# Patient Record
Sex: FEMALE | Race: BLACK OR AFRICAN AMERICAN | Employment: OTHER | URBAN - METROPOLITAN AREA
[De-identification: names, ages, dates, MRNs, and addresses within clinical notes are randomized per-mention and may not be internally consistent; named-entity substitution may affect disease eponyms.]

---

## 2021-01-01 ENCOUNTER — APPOINTMENT (OUTPATIENT)
Dept: GENERAL RADIOLOGY | Age: 81
End: 2021-01-01
Attending: EMERGENCY MEDICINE
Payer: MEDICARE

## 2021-01-01 ENCOUNTER — HOSPITAL ENCOUNTER (EMERGENCY)
Age: 81
End: 2021-04-03
Attending: EMERGENCY MEDICINE
Payer: MEDICARE

## 2021-01-01 ENCOUNTER — APPOINTMENT (OUTPATIENT)
Dept: CT IMAGING | Age: 81
End: 2021-01-01
Attending: EMERGENCY MEDICINE
Payer: MEDICARE

## 2021-01-01 VITALS
DIASTOLIC BLOOD PRESSURE: 81 MMHG | BODY MASS INDEX: 33.38 KG/M2 | SYSTOLIC BLOOD PRESSURE: 137 MMHG | HEIGHT: 60 IN | OXYGEN SATURATION: 98 % | TEMPERATURE: 97.6 F | WEIGHT: 170 LBS

## 2021-01-01 DIAGNOSIS — I71.30 RUPTURED ABDOMINAL AORTIC ANEURYSM (AAA): Primary | ICD-10-CM

## 2021-01-01 DIAGNOSIS — R10.31 RLQ ABDOMINAL PAIN: ICD-10-CM

## 2021-01-01 DIAGNOSIS — I46.9 CARDIOPULMONARY ARREST (HCC): ICD-10-CM

## 2021-01-01 LAB
ALBUMIN SERPL-MCNC: 3.6 G/DL (ref 3.4–5)
ALBUMIN/GLOB SERPL: 1.1 {RATIO} (ref 0.8–1.7)
ALP SERPL-CCNC: 88 U/L (ref 45–117)
ALT SERPL-CCNC: 20 U/L (ref 13–56)
ANION GAP SERPL CALC-SCNC: 11 MMOL/L (ref 3–18)
APTT PPP: 25.7 SEC (ref 23–36.4)
AST SERPL-CCNC: 17 U/L (ref 10–38)
BASOPHILS # BLD: 0 K/UL (ref 0–0.1)
BASOPHILS NFR BLD: 0 % (ref 0–2)
BILIRUB SERPL-MCNC: 0.5 MG/DL (ref 0.2–1)
BUN SERPL-MCNC: 17 MG/DL (ref 7–18)
BUN/CREAT SERPL: 14 (ref 12–20)
CALCIUM SERPL-MCNC: 9.2 MG/DL (ref 8.5–10.1)
CHLORIDE SERPL-SCNC: 108 MMOL/L (ref 100–111)
CK MB CFR SERPL CALC: 2 % (ref 0–4)
CK MB SERPL-MCNC: 1.4 NG/ML (ref 5–25)
CK SERPL-CCNC: 70 U/L (ref 26–192)
CO2 SERPL-SCNC: 23 MMOL/L (ref 21–32)
CREAT SERPL-MCNC: 1.19 MG/DL (ref 0.6–1.3)
DIFFERENTIAL METHOD BLD: ABNORMAL
EOSINOPHIL # BLD: 0 K/UL (ref 0–0.4)
EOSINOPHIL NFR BLD: 0 % (ref 0–5)
ERYTHROCYTE [DISTWIDTH] IN BLOOD BY AUTOMATED COUNT: 14.7 % (ref 11.6–14.5)
GLOBULIN SER CALC-MCNC: 3.3 G/DL (ref 2–4)
GLUCOSE BLD STRIP.AUTO-MCNC: 186 MG/DL (ref 70–110)
GLUCOSE SERPL-MCNC: 190 MG/DL (ref 74–99)
HCT VFR BLD AUTO: 39.8 % (ref 35–45)
HGB BLD-MCNC: 13.6 G/DL (ref 12–16)
INR PPP: 1.1 (ref 0.8–1.2)
LACTATE BLD-SCNC: 4.06 MMOL/L (ref 0.4–2)
LIPASE SERPL-CCNC: 130 U/L (ref 73–393)
LYMPHOCYTES # BLD: 2.4 K/UL (ref 0.9–3.6)
LYMPHOCYTES NFR BLD: 13 % (ref 21–52)
MCH RBC QN AUTO: 32.1 PG (ref 24–34)
MCHC RBC AUTO-ENTMCNC: 34.2 G/DL (ref 31–37)
MCV RBC AUTO: 93.9 FL (ref 74–97)
MONOCYTES # BLD: 1.2 K/UL (ref 0.05–1.2)
MONOCYTES NFR BLD: 7 % (ref 3–10)
NEUTS SEG # BLD: 14.9 K/UL (ref 1.8–8)
NEUTS SEG NFR BLD: 80 % (ref 40–73)
PLATELET # BLD AUTO: 223 K/UL (ref 135–420)
PMV BLD AUTO: 10.8 FL (ref 9.2–11.8)
POTASSIUM SERPL-SCNC: 3.5 MMOL/L (ref 3.5–5.5)
PROT SERPL-MCNC: 6.9 G/DL (ref 6.4–8.2)
PROTHROMBIN TIME: 14.3 SEC (ref 11.5–15.2)
RBC # BLD AUTO: 4.24 M/UL (ref 4.2–5.3)
SODIUM SERPL-SCNC: 142 MMOL/L (ref 136–145)
TROPONIN I SERPL-MCNC: <0.02 NG/ML (ref 0–0.04)
WBC # BLD AUTO: 18.5 K/UL (ref 4.6–13.2)

## 2021-01-01 PROCEDURE — 99291 CRITICAL CARE FIRST HOUR: CPT

## 2021-01-01 PROCEDURE — 82550 ASSAY OF CK (CPK): CPT

## 2021-01-01 PROCEDURE — 96374 THER/PROPH/DIAG INJ IV PUSH: CPT

## 2021-01-01 PROCEDURE — 74011250636 HC RX REV CODE- 250/636: Performed by: EMERGENCY MEDICINE

## 2021-01-01 PROCEDURE — 36430 TRANSFUSION BLD/BLD COMPNT: CPT

## 2021-01-01 PROCEDURE — 92960 CARDIOVERSION ELECTRIC EXT: CPT

## 2021-01-01 PROCEDURE — 93005 ELECTROCARDIOGRAM TRACING: CPT

## 2021-01-01 PROCEDURE — 86920 COMPATIBILITY TEST SPIN: CPT

## 2021-01-01 PROCEDURE — 75810000304 HC PLACE NEED INTRAOSSEOUS INFUS

## 2021-01-01 PROCEDURE — 85730 THROMBOPLASTIN TIME PARTIAL: CPT

## 2021-01-01 PROCEDURE — 86901 BLOOD TYPING SEROLOGIC RH(D): CPT

## 2021-01-01 PROCEDURE — 82962 GLUCOSE BLOOD TEST: CPT

## 2021-01-01 PROCEDURE — 83690 ASSAY OF LIPASE: CPT

## 2021-01-01 PROCEDURE — 85610 PROTHROMBIN TIME: CPT

## 2021-01-01 PROCEDURE — 74176 CT ABD & PELVIS W/O CONTRAST: CPT

## 2021-01-01 PROCEDURE — 80053 COMPREHEN METABOLIC PANEL: CPT

## 2021-01-01 PROCEDURE — 74011250636 HC RX REV CODE- 250/636

## 2021-01-01 PROCEDURE — 83605 ASSAY OF LACTIC ACID: CPT

## 2021-01-01 PROCEDURE — 74011000250 HC RX REV CODE- 250: Performed by: EMERGENCY MEDICINE

## 2021-01-01 PROCEDURE — 85025 COMPLETE CBC W/AUTO DIFF WBC: CPT

## 2021-01-01 PROCEDURE — P9016 RBC LEUKOCYTES REDUCED: HCPCS

## 2021-01-01 PROCEDURE — 92950 HEART/LUNG RESUSCITATION CPR: CPT

## 2021-01-01 PROCEDURE — 31500 INSERT EMERGENCY AIRWAY: CPT

## 2021-01-01 PROCEDURE — C1751 CATH, INF, PER/CENT/MIDLINE: HCPCS

## 2021-01-01 PROCEDURE — 96375 TX/PRO/DX INJ NEW DRUG ADDON: CPT

## 2021-01-01 RX ORDER — SODIUM CHLORIDE 9 MG/ML
250 INJECTION, SOLUTION INTRAVENOUS AS NEEDED
Status: DISCONTINUED | OUTPATIENT
Start: 2021-01-01 | End: 2021-04-03 | Stop reason: HOSPADM

## 2021-01-01 RX ORDER — ONDANSETRON 2 MG/ML
INJECTION INTRAMUSCULAR; INTRAVENOUS
Status: COMPLETED
Start: 2021-01-01 | End: 2021-01-01

## 2021-01-01 RX ORDER — ROCURONIUM BROMIDE 10 MG/ML
INJECTION, SOLUTION INTRAVENOUS
Status: COMPLETED | OUTPATIENT
Start: 2021-01-01 | End: 2021-01-01

## 2021-01-01 RX ORDER — ROCURONIUM BROMIDE 10 MG/ML
70 INJECTION, SOLUTION INTRAVENOUS
Status: DISCONTINUED | OUTPATIENT
Start: 2021-01-01 | End: 2021-04-03 | Stop reason: HOSPADM

## 2021-01-01 RX ORDER — NALOXONE HYDROCHLORIDE 1 MG/ML
0.5 INJECTION INTRAMUSCULAR; INTRAVENOUS; SUBCUTANEOUS ONCE
Status: COMPLETED | OUTPATIENT
Start: 2021-01-01 | End: 2021-01-01

## 2021-01-01 RX ORDER — MORPHINE SULFATE 4 MG/ML
4 INJECTION INTRAVENOUS ONCE
Status: COMPLETED | OUTPATIENT
Start: 2021-01-01 | End: 2021-01-01

## 2021-01-01 RX ORDER — EPINEPHRINE 0.1 MG/ML
INJECTION INTRACARDIAC; INTRAVENOUS
Status: COMPLETED | OUTPATIENT
Start: 2021-01-01 | End: 2021-01-01

## 2021-01-01 RX ORDER — PRAVASTATIN SODIUM 20 MG/1
20 TABLET ORAL
COMMUNITY

## 2021-01-01 RX ORDER — SODIUM CHLORIDE 0.9 % (FLUSH) 0.9 %
5-10 SYRINGE (ML) INJECTION AS NEEDED
Status: DISCONTINUED | OUTPATIENT
Start: 2021-01-01 | End: 2021-04-03 | Stop reason: HOSPADM

## 2021-01-01 RX ORDER — ETOMIDATE 2 MG/ML
20 INJECTION INTRAVENOUS ONCE
Status: DISCONTINUED | OUTPATIENT
Start: 2021-01-01 | End: 2021-04-03 | Stop reason: HOSPADM

## 2021-01-01 RX ORDER — ETOMIDATE 2 MG/ML
INJECTION INTRAVENOUS
Status: COMPLETED | OUTPATIENT
Start: 2021-01-01 | End: 2021-01-01

## 2021-01-01 RX ORDER — EPINEPHRINE 0.1 MG/ML
INJECTION INTRACARDIAC; INTRAVENOUS
Status: DISCONTINUED
Start: 2021-01-01 | End: 2021-01-01 | Stop reason: WASHOUT

## 2021-01-01 RX ORDER — NALOXONE HYDROCHLORIDE 1 MG/ML
INJECTION INTRAMUSCULAR; INTRAVENOUS; SUBCUTANEOUS
Status: DISCONTINUED
Start: 2021-01-01 | End: 2021-04-03 | Stop reason: HOSPADM

## 2021-01-01 RX ORDER — GUAIFENESIN 100 MG/5ML
81 LIQUID (ML) ORAL DAILY
COMMUNITY

## 2021-01-01 RX ORDER — ONDANSETRON 2 MG/ML
4 INJECTION INTRAMUSCULAR; INTRAVENOUS
Status: COMPLETED | OUTPATIENT
Start: 2021-01-01 | End: 2021-01-01

## 2021-01-01 RX ADMIN — EPINEPHRINE 1 MG: 0.1 INJECTION, SOLUTION ENDOTRACHEAL; INTRACARDIAC; INTRAVENOUS at 21:47

## 2021-01-01 RX ADMIN — EPINEPHRINE 1 MG: 0.1 INJECTION, SOLUTION ENDOTRACHEAL; INTRACARDIAC; INTRAVENOUS at 21:50

## 2021-01-01 RX ADMIN — EPINEPHRINE 1 MG: 0.1 INJECTION, SOLUTION ENDOTRACHEAL; INTRACARDIAC; INTRAVENOUS at 21:34

## 2021-01-01 RX ADMIN — EPINEPHRINE 1 MG: 0.1 INJECTION, SOLUTION ENDOTRACHEAL; INTRACARDIAC; INTRAVENOUS at 21:42

## 2021-01-01 RX ADMIN — ETOMIDATE 20 MG: 2 INJECTION, SOLUTION INTRAVENOUS at 21:28

## 2021-01-01 RX ADMIN — EPINEPHRINE 1 MG: 0.1 INJECTION, SOLUTION ENDOTRACHEAL; INTRACARDIAC; INTRAVENOUS at 21:39

## 2021-01-01 RX ADMIN — EPINEPHRINE 1 MG: 0.1 INJECTION, SOLUTION ENDOTRACHEAL; INTRACARDIAC; INTRAVENOUS at 21:44

## 2021-01-01 RX ADMIN — EPINEPHRINE 1 MG: 0.1 INJECTION, SOLUTION ENDOTRACHEAL; INTRACARDIAC; INTRAVENOUS at 21:30

## 2021-01-01 RX ADMIN — EPINEPHRINE 1 MG: 0.1 INJECTION, SOLUTION ENDOTRACHEAL; INTRACARDIAC; INTRAVENOUS at 21:26

## 2021-01-01 RX ADMIN — ROCURONIUM BROMIDE 70 MG: 10 INJECTION, SOLUTION INTRAVENOUS at 21:50

## 2021-01-01 RX ADMIN — EPINEPHRINE 1 MG: 0.1 INJECTION, SOLUTION ENDOTRACHEAL; INTRACARDIAC; INTRAVENOUS at 21:23

## 2021-01-01 RX ADMIN — ONDANSETRON 4 MG: 2 INJECTION INTRAMUSCULAR; INTRAVENOUS at 20:34

## 2021-01-01 RX ADMIN — EPINEPHRINE 1 MG: 0.1 INJECTION, SOLUTION ENDOTRACHEAL; INTRACARDIAC; INTRAVENOUS at 21:37

## 2021-01-01 RX ADMIN — SODIUM CHLORIDE 1000 ML: 900 INJECTION, SOLUTION INTRAVENOUS at 20:33

## 2021-01-01 RX ADMIN — MORPHINE SULFATE 4 MG: 4 INJECTION INTRAVENOUS at 20:33

## 2021-01-01 RX ADMIN — EPINEPHRINE 1 MG: 0.1 INJECTION, SOLUTION ENDOTRACHEAL; INTRACARDIAC; INTRAVENOUS at 21:36

## 2021-01-01 RX ADMIN — NALOXONE HYDROCHLORIDE 0.5 MG: 1 INJECTION PARENTERAL at 20:38

## 2021-04-02 PROBLEM — I71.30 RUPTURED ABDOMINAL AORTIC ANEURYSM (AAA): Status: ACTIVE | Noted: 2021-01-01

## 2021-04-02 PROBLEM — I46.9 CARDIOPULMONARY ARREST (HCC): Status: ACTIVE | Noted: 2021-01-01

## 2021-04-02 PROBLEM — R10.31 RLQ ABDOMINAL PAIN: Status: ACTIVE | Noted: 2021-01-01

## 2021-04-03 PROCEDURE — P9016 RBC LEUKOCYTES REDUCED: HCPCS

## 2021-04-03 PROCEDURE — P9040 RBC LEUKOREDUCED IRRADIATED: HCPCS

## 2021-04-03 NOTE — ED TRIAGE NOTES
Patient brought to ED by EMS c/c RLQ abdominal pain onset 1630 today during a BM. Pt denies any bloody stool, or diarrhea. Pt does endorse nausea, denies vomiting, CP, or SOB.

## 2021-04-03 NOTE — ED NOTES
Doreen Vazquez from [facility] called ED and requested that pt be taken to [facility] and will be picked up tomorrow am

## 2021-04-03 NOTE — ED PROVIDER NOTES
EMERGENCY DEPARTMENT HISTORY AND PHYSICAL EXAM 
 
Date: 4/2/2021 Patient Name: Barbara Bhardwaj History of Presenting Illness Chief Complaint Patient presents with  Abdominal Pain History Provided By: Patient and EMS Barbara Bhardwaj is a 79 y.o. female who presents to the emergency department C/O right lower quadrant abdominal pain. Patient arrives by EMS from home. EMS reports that the pain started shortly after she had a bowel movement this afternoon around 4:30 PM.  Denies any blood in her stool or diarrhea. Patient admits to nausea but denies any vomiting chest pain or shortness of breath. States that she is currently visiting from [place]. Denies any history of abdominal surgeries. Rates her pain severe and sharp 9 out of 10. PCP: None Current Facility-Administered Medications Medication Dose Route Frequency Provider Last Rate Last Admin  
 naloxone (NARCAN) 1 mg/mL injection  sodium chloride 0.9 % bolus infusion 1,000 mL  1,000 mL IntraVENous ONCE Ruiz De La Fuente, DO      
 0.9% sodium chloride infusion 250 mL  250 mL IntraVENous PRN Ruiz De La Fuente DO      
 sodium chloride (NS) flush 5-10 mL  5-10 mL IntraVENous PRN Ruiz De La Fuente,       
 sodium chloride 0.9 % bolus infusion 1,000 mL  1,000 mL IntraVENous ONCE Ruiz Torres, DO Followed by  
 sodium chloride 0.9 % bolus infusion 1,000 mL  1,000 mL IntraVENous ONCE Ruiz De La Fuente DO Followed by  
 sodium chloride 0.9 % bolus infusion 313 mL  313 mL IntraVENous ONCE Ruiz De La Fuente, DO      
 piperacillin-tazobactam (ZOSYN) 3.375 g in 0.9% sodium chloride (MBP/ADV) 100 mL MBP  3.375 g IntraVENous Q6H Ruiz De La Fuente DO      
 etomidate (AMIDATE) 2 mg/mL injection 20 mg  20 mg IntraVENous ONCE Ruiz De La Fuente A, DO      
 rocuronium injection 70 mg  70 mg IntraVENous NOW Marlys CORDOBA DO      
 
Current Outpatient Medications Medication Sig Dispense Refill  aspirin 81 mg chewable tablet Take 81 mg by mouth daily.  pravastatin (PRAVACHOL) 20 mg tablet Take 20 mg by mouth nightly. Past History Past Medical History: 
Past Medical History:  
Diagnosis Date  Hypertension Past Surgical History: 
History reviewed. No pertinent surgical history. Family History: 
History reviewed. No pertinent family history. Social History: 
Social History Tobacco Use  Smoking status: Former Smoker  Smokeless tobacco: Never Used Substance Use Topics  Alcohol use: Not Currently  Drug use: Not Currently Allergies: Allergies Allergen Reactions  Morphine Shortness of Breath Develops Apnea with low doses of morphine Review of Systems Review of Systems Constitutional: Negative for fever. Respiratory: Negative for shortness of breath. Cardiovascular: Negative for chest pain. Gastrointestinal: Positive for abdominal pain and nausea. Negative for anal bleeding, blood in stool, constipation, diarrhea and vomiting. Genitourinary: Negative for dysuria and flank pain. All other systems reviewed and are negative. Physical Exam  
 
Vitals:  
 04/02/21 2217 04/02/21 2219 04/02/21 2220 04/02/21 2220 BP:      
Pulse: (!) 0 (!) 0 (!) 0 (!) 0 Resp:      
Temp:      
SpO2:      
Weight:      
Height:      
 
Physical Exam 
 
Nursing notes and vital signs reviewed Airway: intact, speaking normally Breathing: No apparent distress, no cyanosis Circulation: Peripheral pulses equal 
 
Constitutional: Non toxic appearing, uncomfortable appearing in moderate distress, appearing stated age Head: Normocephalic, Atraumatic Eyes: PERRL, EOMI, No conjunctival injection Ears: external ears normal 
Nose: No rhinorrhea, external nose normal 
Throat: mucous membranes moist 
Neck: symmetric, trachea midline, no obvious swelling, no JVD, no obvious deformity Cardiovascular: Regular rate and rhythm, no murmurs Chest: No palpable tenderness, structural deformity or crepitus Lungs: Clear to ausculation bilaterally, No stridor, Normal work of breathing and chest excursion bilaterally Abdomen: Soft, right lower quadrant reproducible tenderness to palpation, non distended, normoactive bowel sounds, No rigidity, no peritoneal signs, no palpable masses Musculoskeletal:  No evidence of obvious deformity to the back, extremities, no LE edema Skin: Warm, dry, No obvious rashes Neuro: Alert and oriented x 3, CN 2-12 intact, normal speech, strength and sensation full and symmetric bilaterally Psychiatric: Normal mood and affect Diagnostic Study Results Labs - Recent Results (from the past 72 hour(s)) EKG, 12 LEAD, INITIAL Collection Time: 04/02/21  8:08 PM  
Result Value Ref Range Ventricular Rate 81 BPM  
 Atrial Rate 81 BPM  
 P-R Interval 152 ms QRS Duration 72 ms Q-T Interval 410 ms QTC Calculation (Bezet) 476 ms Calculated P Axis 69 degrees Calculated R Axis -38 degrees Calculated T Axis 36 degrees Diagnosis Normal sinus rhythm Left axis deviation Moderate voltage criteria for LVH, may be normal variant Nonspecific ST abnormality Abnormal ECG No previous ECGs available CBC WITH AUTOMATED DIFF Collection Time: 04/02/21  8:35 PM  
Result Value Ref Range WBC 18.5 (H) 4.6 - 13.2 K/uL  
 RBC 4.24 4.20 - 5.30 M/uL  
 HGB 13.6 12.0 - 16.0 g/dL HCT 39.8 35.0 - 45.0 % MCV 93.9 74.0 - 97.0 FL  
 MCH 32.1 24.0 - 34.0 PG  
 MCHC 34.2 31.0 - 37.0 g/dL  
 RDW 14.7 (H) 11.6 - 14.5 % PLATELET 226 220 - 289 K/uL MPV 10.8 9.2 - 11.8 FL  
 NEUTROPHILS 80 (H) 40 - 73 % LYMPHOCYTES 13 (L) 21 - 52 % MONOCYTES 7 3 - 10 % EOSINOPHILS 0 0 - 5 % BASOPHILS 0 0 - 2 %  
 ABS. NEUTROPHILS 14.9 (H) 1.8 - 8.0 K/UL  
 ABS. LYMPHOCYTES 2.4 0.9 - 3.6 K/UL  
 ABS. MONOCYTES 1.2 0.05 - 1.2 K/UL  
 ABS. EOSINOPHILS 0.0 0.0 - 0.4 K/UL  
 ABS.  BASOPHILS 0.0 0.0 - 0.1 K/UL  
 DF AUTOMATED METABOLIC PANEL, COMPREHENSIVE Collection Time: 21  8:35 PM  
Result Value Ref Range Sodium 142 136 - 145 mmol/L Potassium 3.5 3.5 - 5.5 mmol/L Chloride 108 100 - 111 mmol/L  
 CO2 23 21 - 32 mmol/L Anion gap 11 3.0 - 18 mmol/L Glucose 190 (H) 74 - 99 mg/dL BUN 17 7.0 - 18 MG/DL Creatinine 1.19 0.6 - 1.3 MG/DL  
 BUN/Creatinine ratio 14 12 - 20 GFR est AA 53 (L) >60 ml/min/1.73m2 GFR est non-AA 43 (L) >60 ml/min/1.73m2 Calcium 9.2 8.5 - 10.1 MG/DL Bilirubin, total 0.5 0.2 - 1.0 MG/DL  
 ALT (SGPT) 20 13 - 56 U/L  
 AST (SGOT) 17 10 - 38 U/L Alk. phosphatase 88 45 - 117 U/L Protein, total 6.9 6.4 - 8.2 g/dL Albumin 3.6 3.4 - 5.0 g/dL Globulin 3.3 2.0 - 4.0 g/dL A-G Ratio 1.1 0.8 - 1.7 LIPASE Collection Time: 21  8:35 PM  
Result Value Ref Range Lipase 130 73 - 393 U/L  
PROTHROMBIN TIME + INR Collection Time: 21  8:35 PM  
Result Value Ref Range Prothrombin time 14.3 11.5 - 15.2 sec INR 1.1 0.8 - 1.2 PTT Collection Time: 21  8:35 PM  
Result Value Ref Range aPTT 25.7 23.0 - 36.4 SEC CARDIAC PANEL,(CK, CKMB & TROPONIN) Collection Time: 21  8:35 PM  
Result Value Ref Range CK - MB 1.4 <3.6 ng/ml CK-MB Index 2.0 0.0 - 4.0 % CK 70 26 - 192 U/L Troponin-I, QT <0.02 0.0 - 0.045 NG/ML  
GLUCOSE, POC Collection Time: 21  8:37 PM  
Result Value Ref Range Glucose (POC) 186 (H) 70 - 110 mg/dL POC LACTIC ACID Collection Time: 21  8:45 PM  
Result Value Ref Range Lactic Acid (POC) 4.06 (HH) 0.40 - 2.00 mmol/L  
EMERGENT RELEASE OF UNCROSSMATCHED RED CELLS Collection Time: 21  9:15 PM  
Result Value Ref Range Crossmatch Expiration 2021,7719 ABO/Rh(D) PENDING Antibody screen PENDING Comment NO SPECIMEN RECIEVED. PATIENT  PER CC AT 2153 ON 2021. Unit number [id number] Blood component type RC LRIR  Unit division 00 Status of unit ISSUED Crossmatch result PENDING Radiologic Studies -  
CT CHEST ABD PELV WO CONT Final Result Chest: No acute process. Ectasia of the ascending thoracic aorta. Small hiatal  
hernia with gastroesophageal reflux. Abdomen and pelvis: 7.1 x 6.9 cm infrarenal abdominal aortic aneurysm with large  
amount of retroperitoneal and abdominal hematoma suggesting ruptured aortic  
aneurysm. Hematoma measures over 19 cm in transverse dimension. Dr. Mariana Solares was  
informed 9:25 PM April 2, 2021. This hematoma surrounds the left kidney, left adrenal, pancreas and bowel loops  
obscuring evaluation of these areas. CT Results  (Last 48 hours) 04/02/21 2120  CT CHEST ABD PELV WO CONT Final result Impression:     
Chest: No acute process. Ectasia of the ascending thoracic aorta. Small hiatal  
hernia with gastroesophageal reflux. Abdomen and pelvis: 7.1 x 6.9 cm infrarenal abdominal aortic aneurysm with large  
amount of retroperitoneal and abdominal hematoma suggesting ruptured aortic  
aneurysm. Hematoma measures over 19 cm in transverse dimension. Dr. Mariana Solares was  
informed 9:25 PM April 2, 2021. This hematoma surrounds the left kidney, left adrenal, pancreas and bowel loops  
obscuring evaluation of these areas. Narrative:  EXAM: CT of the chest, abdomen, and pelvis INDICATION: Severe abdominal pain, hypotension COMPARISON: None. TECHNIQUE: Axial CT imaging of the chest, abdomen, and pelvis was performed  
without intravenous contrast. Multiplanar reformats were generated. One or more  
dose reduction techniques were used on this CT: automated exposure control,  
adjustment of the mAs and/or kVp according to patient size, and iterative  
reconstruction techniques. The specific techniques used on this CT exam have  
been documented in the patient's electronic medical record.  Digital Imaging and Communications in Medicine (DICOM) format image data are available to  
nonaffiliated external healthcare facilities or entities on a secure, media  
free, reciprocally searchable basis with patient authorization for at least a  
12-month period after this study. _______________ FINDINGS:  
   
CHEST:  
   
THYROID: Interval  
   
LYMPH NODES: No enlarged lymph nodes seen. PLEURA: No pleural effusion seen. HEART: Enlarged with minimal pericardial effusion. VASCULATURE/MEDIASTINUM: Moderate calcific atherosclerosis present. There is  
ectasia ascending thoracic aorta 3.9 cm. Small hiatal hernia seen with fluid in  
the mid esophagus suggesting gastroesophageal reflux. LUNGS: No suspicious nodule or mass. No abnormal opacities. There are  
emphysematous changes. No focal airspace disease. AIRWAY: Patent BONES: Please see below  
   
   
   
===============  
   
ABDOMEN/PELVIS:  
   
LIVER, BILIARY: Liver is normal. No biliary dilation. Gallbladder is  
unremarkable. PANCREAS: Obscured SPLEEN: Normal.  
   
ADRENALS: Right adrenal is normal. Left adrenal is obscured KIDNEYS: Right renal cysts are seen. The right kidney demonstrates no  
hydronephrosis or nephrolithiasis. Left kidney is surrounded by hematoma. No hydronephrosis seen. LYMPH NODES: Limited evaluation due to retroperitoneal hematoma. VASCULATURE: There is a large infrarenal abdominal aortic aneurysm measuring 7.1  
x 6.9 cm with large amount of retroperitoneal and abdominal cavity hematoma  
surrounding the pancreas and left kidney suggesting ruptured abdominal aortic  
aneurysm. This hematoma measures over 19 x 9 cm. GASTROINTESTINAL TRACT: The bowel is obscured by the large hematoma in the  
abdominal cavity. Appendix is normal. Colonic diverticulosis. PELVIC ORGANS: There is hemorrhage in the pelvis between the rectum and uterus. Uterus is unremarkable. Urinary bladder is decompressed. BONES: No acute or aggressive osseous abnormalities identified. There is  
osteopenia. Minimal grade 1 anterolisthesis present at L3-L4 and L4-L5 secondary  
to degenerative disc disease. OTHER: None.  
   
_______________ CXR Results  (Last 48 hours) None Medications given in the ED- Medications  
naloxone (NARCAN) 1 mg/mL injection (has no administration in time range)  
sodium chloride 0.9 % bolus infusion 1,000 mL (has no administration in time range) 0.9% sodium chloride infusion 250 mL (has no administration in time range)  
sodium chloride (NS) flush 5-10 mL (has no administration in time range)  
sodium chloride 0.9 % bolus infusion 1,000 mL (has no administration in time range) Followed by  
sodium chloride 0.9 % bolus infusion 1,000 mL (has no administration in time range) Followed by  
sodium chloride 0.9 % bolus infusion 313 mL (has no administration in time range)  
piperacillin-tazobactam (ZOSYN) 3.375 g in 0.9% sodium chloride (MBP/ADV) 100 mL MBP (has no administration in time range)  
etomidate (AMIDATE) 2 mg/mL injection 20 mg (has no administration in time range)  
rocuronium injection 70 mg (has no administration in time range)  
sodium chloride 0.9 % bolus infusion 1,000 mL (0 mL IntraVENous Stopped 4/2/21 2235) morphine injection 4 mg (4 mg IntraVENous Given 4/2/21 2033) ondansetron [facility]) injection 4 mg (4 mg IntraVENous Given 4/2/21 2034)  
naloxone [facility]) injection 0.5 mg (0.5 mg IntraVENous Given 4/2/21 2038) EPINEPHrine (ADRENALIN) 0.1 mg/mL syringe (1 mg IntraVENous Given 4/2/21 2150)  
etomidate (AMIDATE) 2 mg/mL injection (20 mg IntraVENous Given 4/2/21 2128) rocuronium injection (70 mg IntraVENous Given 4/2/21 2150) Medical Decision Making I reviewed the vital signs, available nursing notes, past medical history, past surgical history, family history and social history.  
 
Vital Signs interpretation- I have reviewed the patient's vital signs. Pulse Oximetry interpretation - 100% on Room air Cardiac Monitor interpretation: 
Rate: 72 bpm 
Rhythm: sinus EKG interpretation: (Preliminary) EKG interpretation by Dr. Kwasi Reed Rate 75 normal sinus rhythm, normal axis, no ST changes Records Reviewed: Nursing Notes Procedures: 
Intubation Date/Time: 4/2/2021 10:00 PM 
Performed by: Girma Núñez DO Authorized by: Peter Ramsey Consent:  
  Consent obtained:  Emergent situation Pre-procedure details:  
  Patient status:  Unresponsive Procedure details:  
  Preoxygenation:  Bag valve mask CPR in progress: yes Intubation method:  Oral 
  Oral intubation technique:  Video-assisted Laryngoscope blade: Mac 3 Tube size (mm):  7.0 Tube type:  Cuffed Number of attempts:  1 Ventilation between attempts: yes Tube visualized through cords: yes Placement assessment: ETT to lip:  25 ETT to teeth:  23 Tube secured with:  ETT ignacio Breath sounds:  Equal 
  Placement verification: chest rise, ETCO2 detector and tube exhalation CXR findings:  ETT in proper place Post-procedure details:  
  Patient tolerance of procedure: Tolerated well, no immediate complications Cardioversion, electrical 
 
Date/Time: 4/2/2021 10:01 PM 
Performed by: Girma Núñez DO Authorized by: Peter Ramsey Consent:  
  Consent obtained:  Emergent situation Pre-procedure details:  
  Cardioversion basis:  Emergent Rhythm:  Ventricular tachycardia Electrode placement:  Anterior-lateral 
Attempt one:  
  Cardioversion mode:  Synchronous Waveform:  Monophasic Shock (Joules):  200 Shock outcome:  Conversion to pulseless electrical activity Post-procedure details:  
  Patient status:  Unresponsive Bedside US Date/Time: 4/2/2021 10:02 PM 
Performed by: Girma Núñez DO Authorized by: Peter Ramsey Emergent situation Performed by: Attending Type of procedure: Focused abdominal aorta Indications:  Abdominal pain Proximal transverse:  Adequate Aneurysm: aneurysm present Sonographic Evidence for Aneurysm:  Aneurysm present Confirmation study:  A confirmatory study was obtained while the patient was in the Emergency Department. Bedside US Date/Time: 4/2/2021 10:02 PM 
Performed by: Rosalva Wilhelm DO Authorized by: Liz Zamora Emergent situation Performed by: Attending Type of procedure: Focused cardiac (Echo) Left leg: Indications:  Cardiac arrest 
  Subxiphoid (4 chamber): Adequate Pericardial effusion:  Absent Global Ventricular Function:  Hyperdynamic and severely reduced Interpretation:  Sonographic findings suggestive of volume depletion Critical Care Performed by: Rosalva Wilhelm DO Authorized by: Rosalva Wilhelm DO  
 
Critical care provider statement:  
  Critical care time (minutes):  100 Critical care start time:  4/2/2021 8:20 PM 
  Critical care end time:  4/2/2021 10:04 PM 
  Critical care time was exclusive of:  Separately billable procedures and treating other patients Critical care was necessary to treat or prevent imminent or life-threatening deterioration of the following conditions:  Cardiac failure, circulatory failure and shock Critical care was time spent personally by me on the following activities:  Development of treatment plan with patient or surrogate, discussions with consultants, re-evaluation of patient's condition, ventilator management, ordering and performing treatments and interventions, ordering and review of laboratory studies, ordering and review of radiographic studies, evaluation of patient's response to treatment, examination of patient and obtaining history from patient or surrogate ED Course & MDM:  
Considering the nature of her symptoms will give morphine and Zofran for symptoms and IV fluids and obtain blood work and CT scan. Patient's family member, daughter and granddaughter arrived to the emergency department stating that her only history is hypertension and hypercholesterolemia. 8:40 PM 
After about 12 to 15 minutes of the patient receiving 4 mg of morphine with 4 mg of Zofran she developed some severe apnea. Her pupils were noted to be severely pinpoint and she is not ventilating well. A CODE BLUE was called although the patient did not lose pulses. Patient was ventilated with bag valve mask and she was given 0.5 mg of Narcan with improvement of her condition. POC glucose noted to be around 180. Patient is now awake and oxygen 100% on room air. Patients BP is low 70/50. POC lactic acid noted to be 4 and white count of 18.5. Sepsis protocol was initiated. Given 30 cc/kg fluid bolus 2.3L and IV zosyn. At this point I be concerned for either a ruptured AAA or ruptured bowel. Will obtain bedside ultrasound 9:00 PM 
Patient given a second liter of IV fluids but her pressure continues to be low. A bedside ultrasound was obtained that showed approximately what I believe is a 4.5 cm aortic aneurysm. I do not see any free fluid on FAST exam.  Could still have a retroperitoneal hematoma. At this point I do have a concern for possible rupture. Will give 2 units of and crossed blood and start with a CT scan of her chest abdomen pelvis without contrast for evaluation as we currently do not have an IV established that is appropriate for angiography and the contrast I was told is not able to go through a right IJ central line. Patient's vital signs are volatile at this point. Her pulse pressure is narrow and difficult to measure although her pulses are still intact. Her heart rate will range between bradycardia and tachycardia. She does look more severely uncomfortable moaning and restless. Her extremities are cool to touch.  
 
Received a call from radiology at about 9:20 PM regarding the CT scan showing likely ruptured AAA with large retroperitoneal hematoma. No evidence of aortic aneurysm in the thoracic region. Will contact vascular surgery. At this time the patient started become bradycardic and unresponsive. Pulses were barely felt. A CODE BLUE was called the patient was given 1 mg of epinephrine. A right tibial IO was placed and the patient was started on 2 units of blood. Patient was subsequently intubated with a 7.0 ET tube. CONSULT NOTE: 937 pm 
Dr. Robbin Sandoval spoke with Dr. Kiet Jerry Specialty: vascular surgery Discussed pt's hx, disposition, and available diagnostic and imaging results over the telephone. Reviewed care plans. Stated he is on his way to the hospital although did note that if we are unable to resuscitate the patient she has a poor prognosis. ACLS protocol was continued for approximately 30 minutes. She was given a total of 11 doses of IV epinephrine. She did have 2 episodes of V. tach that was defibrillated at 200 J converting her back to PEA arrest.  Her bedside ultrasound shows minimal cardiac activity. There is no spontaneous breathing. pupils are fixed and dilated. I did have a linda discussion with the patient's daughter and granddaughter regarding the critical nature of her condition. Stated that at this point she is losing blood to the point where she is not perfusing her heart or her brain very well and I be concerned for her long-term prognosis even if we were to get her to surgery. I did ask them what the patient's wishes would be. They stated that the patient would not want to be prolonged resuscitated or under any severe amount of suffering. At this point they were in agreement with termination of resuscitative measures. This was done at approximately 9:50 PM.  Total time of ACLS resuscitative efforts 30 minutes. Patient has no spontaneous breathing, pupils are fixed and dilated, no palpable pulses.   Time of death was called at 9:53 PM. 
 
 
 
Diagnosis and Disposition Critical Care: 9:59 PM 
I have spent 120 minutes of critical care time involved in lab review, consultations with specialist, family decision-making, and documentation. During this entire length of time I was immediately available to the patient. Critical Care: The reason for providing this level of medical care for this critically ill patient was due a critical illness that impaired one or more vital organ systems such that there was a high probability of imminent or life threatening deterioration in the patients condition. This care involved high complexity decision making to assess, manipulate, and support vital system functions, to treat this degreee vital organ system failure and to prevent further life threatening deterioration of the patients condition. CLINICAL IMPRESSION: 
 
1. Ruptured abdominal aortic aneurysm (AAA) ([address] 2. Cardiopulmonary arrest ([address] 3. RLQ abdominal pain Please note that this dictation was completed with [employer], the computer voice recognition software. Quite often unanticipated grammatical, syntax, homophones, and other interpretive errors are inadvertently transcribed by the computer software. Please disregard these errors. Please excuse any errors that have escaped final proofreading.

## 2021-04-03 NOTE — ED NOTES
During code pt remained pulseless and unresponsive;  resp at bedside maintaining airway with ETT tube;

## 2021-04-03 NOTE — ED NOTES
Pt became unresponsive and stopped breathing;  Code called; Pt bagged and code team to bedside;  Pt given narcan and breathing resumed;   Pt moved to front room #3;

## 2021-04-03 NOTE — PROGRESS NOTES
responded to Death of  Nelia Agosto, who was a 79 y.o.,female, The  provided the following Interventions: 
Provided crisis pastoral care, pastoral support and grief interventions. Offered prayers on behalf of the patient. Chart reviewed. Plan: 
Chaplains will continue to follow and will provide pastoral care on an as needed/requested basis and grief support for the family. [facility]ris [facility]  
[phone]

## 2021-04-04 LAB
ABO + RH BLD: NORMAL
ATRIAL RATE: 81 BPM
BLD PROD TYP BPU: NORMAL
BLOOD BANK CMNT PATIENT-IMP: NORMAL
BLOOD GROUP ANTIBODIES SERPL: NORMAL
BPU ID: NORMAL
CALCULATED P AXIS, ECG09: 69 DEGREES
CALCULATED R AXIS, ECG10: -38 DEGREES
CALCULATED T AXIS, ECG11: 36 DEGREES
CROSSMATCH RESULT,%XM: NORMAL
DIAGNOSIS, 93000: NORMAL
P-R INTERVAL, ECG05: 152 MS
Q-T INTERVAL, ECG07: 410 MS
QRS DURATION, ECG06: 72 MS
QTC CALCULATION (BEZET), ECG08: 476 MS
SPECIMEN EXP DATE BLD: NORMAL
STATUS OF UNIT,%ST: NORMAL
UNIT DIVISION, %UDIV: 0
VENTRICULAR RATE, ECG03: 81 BPM